# Patient Record
Sex: MALE | Race: BLACK OR AFRICAN AMERICAN | NOT HISPANIC OR LATINO | Employment: FULL TIME | URBAN - METROPOLITAN AREA
[De-identification: names, ages, dates, MRNs, and addresses within clinical notes are randomized per-mention and may not be internally consistent; named-entity substitution may affect disease eponyms.]

---

## 2024-03-30 ENCOUNTER — HOSPITAL ENCOUNTER (OUTPATIENT)
Facility: HOSPITAL | Age: 34
Setting detail: OBSERVATION
Discharge: HOME | End: 2024-03-31
Attending: EMERGENCY MEDICINE | Admitting: INTERNAL MEDICINE
Payer: COMMERCIAL

## 2024-03-30 ENCOUNTER — APPOINTMENT (EMERGENCY)
Dept: RADIOLOGY | Facility: HOSPITAL | Age: 34
End: 2024-03-30
Payer: COMMERCIAL

## 2024-03-30 DIAGNOSIS — M54.50 ACUTE BILATERAL LOW BACK PAIN WITHOUT SCIATICA: ICD-10-CM

## 2024-03-30 DIAGNOSIS — M54.9 BACK PAIN: Primary | ICD-10-CM

## 2024-03-30 PROBLEM — R26.2 AMBULATORY DYSFUNCTION: Status: ACTIVE | Noted: 2024-03-30

## 2024-03-30 LAB
ANION GAP SERPL CALCULATED.3IONS-SCNC: 11 MMOL/L (ref 4–13)
BUN SERPL-MCNC: 14 MG/DL (ref 5–25)
CALCIUM SERPL-MCNC: 9.2 MG/DL (ref 8.4–10.2)
CHLORIDE SERPL-SCNC: 102 MMOL/L (ref 96–108)
CO2 SERPL-SCNC: 23 MMOL/L (ref 21–32)
CREAT SERPL-MCNC: 0.93 MG/DL (ref 0.6–1.3)
ERYTHROCYTE [DISTWIDTH] IN BLOOD BY AUTOMATED COUNT: 12.1 % (ref 11.6–15.1)
GFR SERPL CREATININE-BSD FRML MDRD: 107 ML/MIN/1.73SQ M
GLUCOSE SERPL-MCNC: 93 MG/DL (ref 65–140)
HCT VFR BLD AUTO: 44.1 % (ref 36.5–49.3)
HGB BLD-MCNC: 14.4 G/DL (ref 12–17)
MCH RBC QN AUTO: 28.3 PG (ref 26.8–34.3)
MCHC RBC AUTO-ENTMCNC: 32.7 G/DL (ref 31.4–37.4)
MCV RBC AUTO: 87 FL (ref 82–98)
PLATELET # BLD AUTO: 275 THOUSANDS/UL (ref 149–390)
PMV BLD AUTO: 9.6 FL (ref 8.9–12.7)
POTASSIUM SERPL-SCNC: 3.7 MMOL/L (ref 3.5–5.3)
RBC # BLD AUTO: 5.09 MILLION/UL (ref 3.88–5.62)
SODIUM SERPL-SCNC: 136 MMOL/L (ref 135–147)
WBC # BLD AUTO: 5.17 THOUSAND/UL (ref 4.31–10.16)

## 2024-03-30 PROCEDURE — 85027 COMPLETE CBC AUTOMATED: CPT | Performed by: INTERNAL MEDICINE

## 2024-03-30 PROCEDURE — 96372 THER/PROPH/DIAG INJ SC/IM: CPT

## 2024-03-30 PROCEDURE — 36415 COLL VENOUS BLD VENIPUNCTURE: CPT | Performed by: INTERNAL MEDICINE

## 2024-03-30 PROCEDURE — 99283 EMERGENCY DEPT VISIT LOW MDM: CPT

## 2024-03-30 PROCEDURE — 80048 BASIC METABOLIC PNL TOTAL CA: CPT | Performed by: INTERNAL MEDICINE

## 2024-03-30 PROCEDURE — 72110 X-RAY EXAM L-2 SPINE 4/>VWS: CPT

## 2024-03-30 PROCEDURE — 99285 EMERGENCY DEPT VISIT HI MDM: CPT | Performed by: EMERGENCY MEDICINE

## 2024-03-30 PROCEDURE — 99222 1ST HOSP IP/OBS MODERATE 55: CPT | Performed by: INTERNAL MEDICINE

## 2024-03-30 RX ORDER — KETOROLAC TROMETHAMINE 30 MG/ML
15 INJECTION, SOLUTION INTRAMUSCULAR; INTRAVENOUS ONCE
Status: COMPLETED | OUTPATIENT
Start: 2024-03-30 | End: 2024-03-30

## 2024-03-30 RX ORDER — METHOCARBAMOL 750 MG/1
750 TABLET, FILM COATED ORAL EVERY 6 HOURS SCHEDULED
Status: DISCONTINUED | OUTPATIENT
Start: 2024-03-31 | End: 2024-03-31 | Stop reason: HOSPADM

## 2024-03-30 RX ORDER — ACETAMINOPHEN 325 MG/1
975 TABLET ORAL EVERY 8 HOURS SCHEDULED
Status: DISCONTINUED | OUTPATIENT
Start: 2024-03-30 | End: 2024-03-31 | Stop reason: HOSPADM

## 2024-03-30 RX ORDER — OXYCODONE HYDROCHLORIDE 5 MG/1
5 TABLET ORAL ONCE
Status: COMPLETED | OUTPATIENT
Start: 2024-03-30 | End: 2024-03-30

## 2024-03-30 RX ORDER — ONDANSETRON 2 MG/ML
4 INJECTION INTRAMUSCULAR; INTRAVENOUS EVERY 4 HOURS PRN
Status: DISCONTINUED | OUTPATIENT
Start: 2024-03-30 | End: 2024-03-31 | Stop reason: HOSPADM

## 2024-03-30 RX ORDER — PANTOPRAZOLE SODIUM 40 MG/1
40 TABLET, DELAYED RELEASE ORAL
Status: DISCONTINUED | OUTPATIENT
Start: 2024-03-31 | End: 2024-03-31 | Stop reason: HOSPADM

## 2024-03-30 RX ORDER — KETOROLAC TROMETHAMINE 30 MG/ML
15 INJECTION, SOLUTION INTRAMUSCULAR; INTRAVENOUS EVERY 6 HOURS SCHEDULED
Status: DISCONTINUED | OUTPATIENT
Start: 2024-03-31 | End: 2024-03-31 | Stop reason: HOSPADM

## 2024-03-30 RX ORDER — METHYLPREDNISOLONE 4 MG/1
8 TABLET ORAL DAILY
Status: DISCONTINUED | OUTPATIENT
Start: 2024-04-04 | End: 2024-03-31 | Stop reason: HOSPADM

## 2024-03-30 RX ORDER — METHOCARBAMOL 500 MG/1
500 TABLET, FILM COATED ORAL ONCE
Status: COMPLETED | OUTPATIENT
Start: 2024-03-30 | End: 2024-03-30

## 2024-03-30 RX ORDER — LIDOCAINE 50 MG/G
1 PATCH TOPICAL DAILY
Status: DISCONTINUED | OUTPATIENT
Start: 2024-03-30 | End: 2024-03-31 | Stop reason: HOSPADM

## 2024-03-30 RX ORDER — METHYLPREDNISOLONE 4 MG/1
4 TABLET ORAL DAILY
Status: DISCONTINUED | OUTPATIENT
Start: 2024-04-05 | End: 2024-03-31 | Stop reason: HOSPADM

## 2024-03-30 RX ORDER — METHYLPREDNISOLONE 16 MG/1
16 TABLET ORAL DAILY
Status: DISCONTINUED | OUTPATIENT
Start: 2024-04-02 | End: 2024-03-31 | Stop reason: HOSPADM

## 2024-03-30 RX ORDER — METHYLPREDNISOLONE 4 MG/1
12 TABLET ORAL DAILY
Status: DISCONTINUED | OUTPATIENT
Start: 2024-04-03 | End: 2024-03-31 | Stop reason: HOSPADM

## 2024-03-30 RX ORDER — OXYCODONE HYDROCHLORIDE 5 MG/1
5 TABLET ORAL ONCE
Status: DISCONTINUED | OUTPATIENT
Start: 2024-03-30 | End: 2024-03-30

## 2024-03-30 RX ADMIN — PREDNISONE 50 MG: 20 TABLET ORAL at 21:14

## 2024-03-30 RX ADMIN — METHOCARBAMOL 500 MG: 500 TABLET ORAL at 20:48

## 2024-03-30 RX ADMIN — OXYCODONE HYDROCHLORIDE 5 MG: 5 TABLET ORAL at 21:40

## 2024-03-30 RX ADMIN — LIDOCAINE 5% 1 PATCH: 700 PATCH TOPICAL at 23:41

## 2024-03-30 RX ADMIN — KETOROLAC TROMETHAMINE 15 MG: 30 INJECTION, SOLUTION INTRAMUSCULAR; INTRAVENOUS at 20:50

## 2024-03-30 RX ADMIN — ACETAMINOPHEN 975 MG: 325 TABLET, FILM COATED ORAL at 23:41

## 2024-03-30 NOTE — Clinical Note
Case was discussed with Dr. Parsons and the patient's admission status was agreed to be Admission Status: observation status to the service of Dr. Parsons

## 2024-03-31 VITALS
BODY MASS INDEX: 23.62 KG/M2 | OXYGEN SATURATION: 99 % | SYSTOLIC BLOOD PRESSURE: 128 MMHG | DIASTOLIC BLOOD PRESSURE: 68 MMHG | HEART RATE: 63 BPM | WEIGHT: 190 LBS | RESPIRATION RATE: 16 BRPM | HEIGHT: 75 IN | TEMPERATURE: 98.2 F

## 2024-03-31 PROCEDURE — 99239 HOSP IP/OBS DSCHRG MGMT >30: CPT | Performed by: NURSE PRACTITIONER

## 2024-03-31 RX ORDER — METHYLPREDNISOLONE 4 MG/1
TABLET ORAL DAILY
Qty: 15 TABLET | Refills: 0 | Status: SHIPPED | OUTPATIENT
Start: 2024-04-01 | End: 2024-04-05

## 2024-03-31 RX ORDER — PANTOPRAZOLE SODIUM 40 MG/1
40 TABLET, DELAYED RELEASE ORAL
Qty: 30 TABLET | Refills: 0 | Status: SHIPPED | OUTPATIENT
Start: 2024-04-01

## 2024-03-31 RX ORDER — LIDOCAINE 50 MG/G
1 PATCH TOPICAL DAILY
Qty: 5 PATCH | Refills: 0 | Status: SHIPPED | OUTPATIENT
Start: 2024-04-01 | End: 2024-04-06

## 2024-03-31 RX ORDER — METHOCARBAMOL 750 MG/1
750 TABLET, FILM COATED ORAL EVERY 6 HOURS SCHEDULED
Qty: 30 TABLET | Refills: 0 | Status: SHIPPED | OUTPATIENT
Start: 2024-03-31

## 2024-03-31 RX ORDER — ACETAMINOPHEN 500 MG
1000 TABLET ORAL EVERY 8 HOURS
Start: 2024-03-31

## 2024-03-31 RX ADMIN — LIDOCAINE 5% 1 PATCH: 700 PATCH TOPICAL at 08:05

## 2024-03-31 RX ADMIN — METHYLPREDNISOLONE 24 MG: 16 TABLET ORAL at 08:08

## 2024-03-31 RX ADMIN — PANTOPRAZOLE SODIUM 40 MG: 40 TABLET, DELAYED RELEASE ORAL at 06:15

## 2024-03-31 RX ADMIN — KETOROLAC TROMETHAMINE 15 MG: 30 INJECTION, SOLUTION INTRAMUSCULAR; INTRAVENOUS at 03:10

## 2024-03-31 RX ADMIN — METHOCARBAMOL TABLETS 750 MG: 750 TABLET, COATED ORAL at 08:05

## 2024-03-31 RX ADMIN — KETOROLAC TROMETHAMINE 15 MG: 30 INJECTION, SOLUTION INTRAMUSCULAR; INTRAVENOUS at 08:05

## 2024-03-31 RX ADMIN — METHOCARBAMOL TABLETS 750 MG: 750 TABLET, COATED ORAL at 03:10

## 2024-03-31 RX ADMIN — ACETAMINOPHEN 975 MG: 325 TABLET, FILM COATED ORAL at 06:15

## 2024-03-31 NOTE — UTILIZATION REVIEW
Initial Clinical Review    Admission: Date/Time/Statement:   Admission Orders (From admission, onward)       Ordered        03/30/24 1586  Place in Observation  Once                          Orders Placed This Encounter   Procedures    Place in Observation     Standing Status:   Standing     Number of Occurrences:   1     Order Specific Question:   Level of Care     Answer:   Med Surg [16]     ED Arrival Information       Expected   -    Arrival   3/30/2024 20:16    Acuity   Less Urgent              Means of arrival   Walk-In    Escorted by   Family Member    Service   Hospitalist    Admission type   Emergency              Arrival complaint   -             Chief Complaint   Patient presents with    Back Pain     Prior history of back pain (follows spine and pain specialists). Today rode on train in to visit his mom and carried a large duffle bag. Tonight while visiting mom he started to feel the mid low back pain and was unable to move. He then did fall to his knees and was helped back up. Pain radiates down left leg.        Initial Presentation: 33 y.o. male  presents with intractable back pain and inability to ambulate.  Patient states he was lifting a duffel bag while on a train to this area when he developed sudden onset central back pain with radiation into left leg described as dull, constant, and without alleviating/exacerbating factors  patient received IV Toradol, Robaxin, prednisone, and oxycodone with some improvement in pain.   However  pain worsened with any attempted ambulation and  he could not ambulate.  Exam notes bilateral paraspinal hypertonicity somewhat greater on right admitted  as observation for intractable low back pain and ambulatory dysfunction.       ED Triage Vitals   Temperature Pulse Respirations Blood Pressure SpO2   03/30/24 2018 03/30/24 2018 03/30/24 2018 03/30/24 2018 03/30/24 2018   98.2 °F (36.8 °C) 84 16 167/81 100 %      Temp Source Heart Rate Source Patient Position -  "Orthostatic VS BP Location FiO2 (%)   03/30/24 2018 -- 03/30/24 2200 03/30/24 2018 --   Oral  Sitting Right arm       Pain Score       03/30/24 2018       9          Wt Readings from Last 1 Encounters:   03/30/24 86.2 kg (190 lb)     Additional Vital Signs:    Date/Time Temp Pulse Resp BP MAP (mmHg) SpO2 O2 Device Patient Position - Orthostatic VS   03/31/24 0309 -- 63 16 128/68 -- 99 % None (Room air) Lying   03/30/24 2200 -- 68 16 149/71 102 99 % -- Sitting     Pertinent Labs/Diagnostic Test Results:   XR spine lumbar minimum 4 views non injury   Final Result by Austin Singh MD (03/31 0751)      No acute osseous abnormality.         Workstation performed: [id number]               Results from last 7 days   Lab Units 03/30/24  2317   WBC Thousand/uL 5.17   HEMOGLOBIN g/dL 14.4   HEMATOCRIT % 44.1   PLATELETS Thousands/uL 275         Results from last 7 days   Lab Units 03/30/24  2317   SODIUM mmol/L 136   POTASSIUM mmol/L 3.7   CHLORIDE mmol/L 102   CO2 mmol/L 23   ANION GAP mmol/L 11   BUN mg/dL 14   CREATININE mg/dL 0.93   EGFR ml/min/1.73sq m 107   CALCIUM mg/dL 9.2             Results from last 7 days   Lab Units 03/30/24  2317   GLUCOSE RANDOM mg/dL 93             No results found for: \"BETA-HYDROXYBUTYRATE\"                                                                                                                                         ED Treatment:   Medication Administration from 03/30/2024 2016 to 03/31/2024 0838         Date/Time Order Dose Route Action Action by Comments     03/30/2024 2050 EDT ketorolac (TORADOL) injection 15 mg 15 mg Intramuscular Given Batool Rodrigez RN --     03/30/2024 2048 EDT methocarbamol (ROBAXIN) tablet 500 mg 500 mg Oral Given Batool Rodrigez RN --     03/30/2024 2114 EDT predniSONE tablet 50 mg 50 mg Oral Given Batool Rodrigez RN --     03/30/2024 2140 EDT oxyCODONE (ROXICODONE) IR tablet 5 mg 5 mg Oral Given Batool Rodrigez RN --     03/30/2024 2236 EDT " oxyCODONE (ROXICODONE) IR tablet 5 mg 5 mg Oral Not Given Batool Rodrigez RN discontinued     03/31/2024 0615 EDT acetaminophen (TYLENOL) tablet 975 mg 975 mg Oral Given Batool Rodrigez RN --     03/30/2024 2341 EDT acetaminophen (TYLENOL) tablet 975 mg 975 mg Oral Given Batool Rodrigez RN --     03/31/2024 0805 EDT ketorolac (TORADOL) injection 15 mg 15 mg Intravenous Given [facility] --     03/31/2024 0310 EDT ketorolac (TORADOL) injection 15 mg 15 mg Intravenous Given Batool Rodrigez RN --     03/31/2024 0615 EDT pantoprazole (PROTONIX) EC tablet 40 mg 40 mg Oral Given Batool Rodrigez RN --     03/31/2024 0808 EDT methylprednisolone (MEDROL) tablet 24 mg 24 mg Oral Given [facility] --     03/31/2024 0805 EDT lidocaine (LIDODERM) 5 % patch 1 patch 1 patch Topical Medication Applied [facility] --     03/31/2024 0804 EDT lidocaine (LIDODERM) 5 % patch 1 patch 1 patch Topical Patch Removed [facility] --     03/30/2024 2341 EDT lidocaine (LIDODERM) 5 % patch 1 patch 1 patch Topical Medication Applied Batool Rodrigez RN --     03/31/2024 0805 EDT methocarbamol (ROBAXIN) tablet 750 mg 750 mg Oral Given [facility] --     03/31/2024 0310 EDT methocarbamol (ROBAXIN) tablet 750 mg 750 mg Oral Given Batool Rodrigez RN --          Past Medical History:   Diagnosis Date    Sciatica      Present on Admission:   Acute bilateral low back pain without sciatica   Ambulatory dysfunction      Admitting Diagnosis: Back pain [M54.9]  Age/Sex: 33 y.o. male  Admission Orders:  Scheduled Medications:  acetaminophen, 975 mg, Oral, Q8H KAREN  ketorolac, 15 mg, Intravenous, Q6H KAREN  lidocaine, 1 patch, Topical, Daily  methocarbamol, 750 mg, Oral, Q6H KAREN  [START ON 4/1/2024] methylPREDNISolone, 20 mg, Oral, Daily   Followed by  [START ON 4/2/2024] methylPREDNISolone, 16 mg, Oral, Daily   Followed by  [START ON 4/3/2024] methylPREDNISolone, 12 mg, Oral, Daily   Followed by  [START ON 4/4/2024] methylPREDNISolone, 8 mg, Oral,  Daily   Followed by  [START ON 4/5/2024] methylPREDNISolone, 4 mg, Oral, Daily  pantoprazole, 40 mg, Oral, Early Morning      Continuous IV Infusions:     PRN Meds:  ondansetron, 4 mg, Intravenous, Q4H PRN    Aqua K   Reg diet    Activity as clare     VS routine      None    Network Utilization Review Department  ATTENTION: Please call with any questions or concerns to [phone] and carefully listen to the prompts so that you are directed to the right person. All voicemails are confidential.   For Discharge needs, contact Care Management DC Support Team at [phone] opt. [phone]  Send all requests for admission clinical reviews, approved or denied determinations and any other requests to dedicated fax number below belonging to the campus where the patient is receiving treatment. List of dedicated fax numbers for the Facilities:  FACILITY NAME UR FAX NUMBER   ADMISSION DENIALS (Administrative/Medical Necessity) [phone]   DISCHARGE SUPPORT TEAM (NETWORK) [phone]   PARENT CHILD HEALTH (Maternity/NICU/Pediatrics) [phone]   [facility] [phone]   [facility] [phone]   [facility] [phone]   [facility] [phone]   [facility] [phone]   [facility] [phone]   General acute hospital [phone]   [facility] [phone]   [facility] [phone]   [facility] [phone]   [facility] [phone]   [facility] [phone]

## 2024-03-31 NOTE — DISCHARGE SUMMARY
[facility]  Discharge- Bonnie Rudd 1990, 33 y.o. male MRN: [id number]  Unit/Bed#: [facility] Encounter: [id number]  Primary Care Provider: No primary care provider on file.   Date and time admitted to hospital: 3/30/2024  8:16 PM    * Acute bilateral low back pain without sciatica  Assessment & Plan  Presenting with intractable low back pain and inability to ambulate secondary to this after lifting heavy object/ luggage bag from [place]   Pain somewhat improved following Toradol, prednisone, Robaxin, and oxycodone during ED evaluation . Had ongoing pain but improved with multiple doses of medications   Obtained x-ray lumbar spine,  no acute osseous abnormality identified  Trial pain control regimen with scheduled Tylenol, Lidoderm patch, Toradol IV, Robaxin, Medrol Dosepak with Protonix for acid suppression  Pt reports he is doing better after the night of medications ,able to walk no urinary or bowel concerns.   Pt with good lower extremity strength   Instruction provided to the pt in regard to medication regimen at discharge  Instructed to call and schedule fu appointment with neurosurgery if ongoing concern and if staying in the area. Otherwise will seek fu in his home area of [place]    Ambulatory dysfunction  Assessment & Plan  In the setting of intractable back pain, management of this as above.    Pt able to ambulate this am       Medical Problems       Resolved Problems  Date Reviewed: 3/31/2024   None       Discharging Physician / Practitioner: JOSÉ MIGUEL Tai  PCP: No primary care provider on file.  Admission Date:   Admission Orders (From admission, onward)       Ordered        03/30/24 2126  Place in Observation  Once                          Discharge Date: 03/31/24    Consultations During Hospital Stay:  none    Procedures Performed:   Lumbar xray of spine: No acute osseous abnormality.     Significant Findings / Test Results:   See above      Incidental Findings:   none       Test Results Pending at Discharge (will require follow up):   none     Outpatient Tests Requested:  Call to schedule follow up with a family doctor     Complications:  none    Reason for Admission: lumbar back pain     Hospital Course:   Bonnie Rudd is a 33 y.o. male patient who originally presented to the hospital on 3/30/2024 due to lumbar back pain.  Patient reported he was lifting a duffel bag while on a train and commute to this area where he developed sudden onset of central back pain with radiation to the left leg described as dull, constant, without any alleviating exacerbating factors.  He denied any fevers or chills no focal weaknesses no perianal numbness urinary fecal incontinence or apparent urinary retention.  And denied any IV drug use or cancer history.  Patient reported that he was told by a physician previously that he had a herniated disc which she was managing conservatively.  While patient was evaluated in the ED he received IV Toradol, Robaxin prednisone and oxycodone and had some improvement with pain however this persistent and worsened with any attempt at ambulation.  Patient was admitted for further observation.  While in the ED patient continued to be treated with Toradol Robaxin and NSAIDs along with around-the-clock Tylenol.  Patient reported significant improvement as of this morning.  He stated he was able to ambulate.  We discussed taking it easy for a few weeks.  Patient had large duffel bag in the room requested that his family obtain this back and carried rather than he at discharge.  Patient's mother was admitted to the ICU for brain surgery upcoming.  Patient was eager to be discharged to be at her side.  Medications were ordered and sent to [facility] to assist with ongoing discomfort.  Patient will trial for the next 5 to 7 days.  He was instructed to call and schedule outpatient appointment with neurosurgery if staying in the area versus  "cool the prior orthopedic surgeon he had seen in [place].  On examination this morning patient had equal strength in bilateral lower extremities.  No numbness or paresthesias no cauda equina type symptoms.  Patient medically stable for discharge.        Please see above list of diagnoses and related plan for additional information.     Condition at Discharge: good    Discharge Day Visit / Exam:   Subjective:  Pt reports he is doing well and now able to walk and function. Conservatively. He understands plan he denies any numbness tingling has equal strength and sensation in both legs   Vitals: Blood Pressure: 128/68 (03/31/24 0309)  Pulse: 63 (03/31/24 0309)  Temperature: 98.2 °F (36.8 °C) (03/30/24 2018)  Temp Source: Oral (03/30/24 2018)  Respirations: 16 (03/31/24 0309)  Height: 6' 3\" (190.5 cm) (03/30/24 2018)  Weight - Scale: 86.2 kg (190 lb) (03/30/24 2018)  SpO2: 99 % (03/31/24 0309)  Exam:   Physical Exam  Constitutional:       General: He is not in acute distress.     Appearance: He is not ill-appearing, toxic-appearing or diaphoretic.   HENT:      Head: Normocephalic and atraumatic.      Mouth/Throat:      Pharynx: Oropharynx is clear.   Eyes:      General:         Right eye: No discharge.         Left eye: No discharge.   Cardiovascular:      Rate and Rhythm: Normal rate.      Heart sounds: No murmur heard.     No friction rub. No gallop.   Pulmonary:      Effort: No respiratory distress.      Breath sounds: No stridor. No wheezing, rhonchi or rales.   Chest:      Chest wall: No tenderness.   Abdominal:      General: There is no distension.      Palpations: There is no mass.      Tenderness: There is no abdominal tenderness. There is no guarding or rebound.      Hernia: No hernia is present.   Musculoskeletal:         General: Tenderness (lumbar spine midlevel) present. No swelling, deformity or signs of injury.      Right lower leg: No edema.      Left lower leg: No edema.   Skin:     Coloration: Skin is " not jaundiced or pale.      Findings: No bruising, erythema, lesion or rash.   Neurological:      Mental Status: He is alert and oriented to person, place, and time.   Psychiatric:         Behavior: Behavior normal.          Discussion with Family: Patient declined call to .     Discharge instructions/Information to patient and family:   See after visit summary for information provided to patient and family.      Provisions for Follow-Up Care:  See after visit summary for information related to follow-up care and any pertinent home health orders.      Mobility at time of Discharge:   Basic Mobility Inpatient Raw Score: 18  -[facility] Goal: 6: Walk 10 steps or more  -HLM Achieved: 6: Walk 10 steps or more  Pt reports he has been walking around the room      Disposition:   Home    Planned Readmission: no plan for readmission      Discharge Statement:  I spent 38 minutes discharging the patient. This time was spent on the day of discharge. I had direct contact with the patient on the day of discharge. Greater than 50% of the total time was spent examining patient, answering all patient questions, arranging and discussing plan of care with patient as well as directly providing post-discharge instructions.  Additional time then spent on discharge activities.    Discharge Medications:  See after visit summary for reconciled discharge medications provided to patient and/or family.      **Please Note: This note may have been constructed using a voice recognition system**

## 2024-03-31 NOTE — ASSESSMENT & PLAN NOTE
Presenting with intractable low back pain and inability to ambulate secondary to this after lifting heavy object  Pain somewhat improved following Toradol, prednisone, Robaxin, and oxycodone during ED evaluation however persisting with inability to ambulate secondary to degree of pain  Obtained x-ray lumbar spine, await final read however no acute osseous abnormality identified  Trial pain control regimen with scheduled Tylenol, Lidoderm patch, Toradol IV, Robaxin, Medrol Dosepak with Protonix for acid suppression  Ambulatory trial in a.m., may need PT/OT evaluation if he remains unable to ambulate

## 2024-03-31 NOTE — ED PROVIDER NOTES
History  Chief Complaint   Patient presents with    Back Pain     Prior history of back pain (follows spine and pain specialists). Today rode on train in to visit his mom and carried a large duffle bag. Tonight while visiting mom he started to feel the mid low back pain and was unable to move. He then did fall to his knees and was helped back up. Pain radiates down left leg.      HPI  Patient is 33-year-old male with history of low back pain presenting to ED for low back pain. Patient reports was getting off train while carrying large duffel bag and low back pain began. Patient reports had to drop bag. Patient reports pain progressively worsening and while at [facility] house dropped to knees due to pain. Patient reports unable to walk due to pain.Patient put lidocaine patch on with no relief.  Patient was seen by physician in [place] where he lives and told had slipped disc based off xrays. Patient denies fever chills, IVDA, leg weakness, bladder/bowel incontinence, urine retention, recent trauma.   None       No past medical history on file.    No past surgical history on file.    No family history on file.  I have reviewed and agree with the history as documented.    No existing history information found.  No existing history information found.        Review of Systems   Constitutional:  Negative for chills and fever.   HENT:  Negative for ear pain and sore throat.    Respiratory:  Negative for cough and shortness of breath.    Cardiovascular:  Negative for chest pain, palpitations and leg swelling.   Gastrointestinal:  Negative for abdominal pain, diarrhea, nausea and vomiting.   Genitourinary:  Negative for dysuria, frequency and hematuria.   Musculoskeletal:  Positive for back pain. Negative for neck pain.   Skin:  Negative for rash.   Neurological:  Negative for dizziness, light-headedness and headaches.       Physical Exam  ED Triage Vitals [03/30/24 2018]   Temperature Pulse Respirations Blood Pressure SpO2    98.2 °F (36.8 °C) 84 16 167/81 100 %      Temp Source Heart Rate Source Patient Position - Orthostatic VS BP Location FiO2 (%)   Oral -- -- Right arm --      Pain Score       9             Orthostatic Vital Signs  Vitals:    03/30/24 2018   BP: 167/81   Pulse: 84       Physical Exam  Vitals reviewed.   Constitutional:       General: He is awake.   HENT:      Head: Normocephalic and atraumatic.      Mouth/Throat:      Mouth: Mucous membranes are moist.   Eyes:      Extraocular Movements: Extraocular movements intact.      Right eye: No nystagmus.      Left eye: No nystagmus.      Conjunctiva/sclera: Conjunctivae normal.      Pupils: Pupils are equal, round, and reactive to light.   Cardiovascular:      Rate and Rhythm: Normal rate and regular rhythm.      Pulses: Normal pulses.      Heart sounds: Normal heart sounds, S1 normal and S2 normal. Heart sounds not distant. No murmur heard.     No friction rub. No gallop.   Pulmonary:      Breath sounds: No stridor. No wheezing, rhonchi or rales.      Comments: CTA b/l   Abdominal:      General: Bowel sounds are normal.      Palpations: Abdomen is soft.      Tenderness: There is no abdominal tenderness.   Musculoskeletal:      Right lower leg: No edema.      Left lower leg: No edema.      Comments: Pain with flexion of back.    Skin:     General: Skin is warm and dry.      Capillary Refill: Capillary refill takes less than 2 seconds.   Neurological:      Mental Status: He is alert and oriented to person, place, and time.      GCS: GCS eye subscore is 4. GCS verbal subscore is 5. GCS motor subscore is 6.      Cranial Nerves: Cranial nerves 2-12 are intact.      Sensory: Sensation is intact.      Comments: + straight leg raise b/l; pain with attempted flexion of hip, 5/5 plantar flexion and dorsiflexion of b/l feet.         ED Medications  Medications - No data to display    Diagnostic Studies  Results Reviewed       None                   No orders to display          Procedures  Procedures      ED Course  ED Course as of 03/30/24 2310   Sat Mar 30, 2024   2137 On re-evaluation no improvement with Toradol or robaxin, will give oxycodone.    2224 On re-eval no relief from oxycodone, still unable to walk.    2301 Still no relief, unable to walk, will reach out to [facility] for admission.                              SBIRT 20yo+      Flowsheet Row Most Recent Value   Initial Alcohol Screen: US AUDIT-C     1. How often do you have a drink containing alcohol? 0 Filed at: 03/30/2024 2019   2. How many drinks containing alcohol do you have on a typical day you are drinking?  0 Filed at: 03/30/2024 2019   3a. Male UNDER 65: How often do you have five or more drinks on one occasion? 0 Filed at: 03/30/2024 2019   Audit-C Score 0 Filed at: 03/30/2024 2019   BEN: How many times in the past year have you...    Used an illegal drug or used a prescription medication for non-medical reasons? Never Filed at: 03/30/2024 2019                  Medical Decision Making  Risk  Prescription drug management.        Patient is 33 y.o. male with PMH of low back pain presenting to ED for low back pain.. See history and physical documented above.       Differential diagnosis included but not limited to MSK back pain, bulging disc Plan symptomatic treatment with toradol, robaxin, prednisone and reassess.    View ED course above for further discussion on patient workup.       All labs reviewed and utilized in the medical decision making process  All radiology studies independently viewed by me and interpreted by the radiologist.  I reviewed all testing with the patient.     Upon re-evaluation patient remains unable to walk secondary to pain. Will admit for observation to [facility] for pain control.        Disposition  Final diagnoses:   Back pain     Time reflects when diagnosis was documented in both MDM as applicable and the Disposition within this note       Time User Action Codes Description Comment     3/30/2024 11:15 PM Latha De Souza [M54.9] Back pain     3/31/2024  9:35 AM Diana Montano [M54.50] Acute bilateral low back pain without sciatica           ED Disposition       ED Disposition   Discharge    Condition   --    Date/Time   Sun Mar 31, 2024  9:59 AM    Comment   Case was discussed with Dr. Parsons and the patient's admission status was agreed to be Admission Status: observation status to the service of Dr. Parsons  Evaluated by SLIM this morning, patients status was agreed to be discharged per Diana VALDEZ             Follow-up Information       Follow up With Specialties Details Why Contact Info    Fer Velazquez MD Neurosurgery Follow up Please call to schedule follow up if ongoing pain and unable to return back to [place] orthopedics in [address] [address] [phone]      Family Doctor  Follow up Should follow up with a family doctor to be able to manage and coordinate all your health care             Patient's Medications    No medications on file     No discharge procedures on file.    PDMP Review       None             ED Provider  Attending physically available and evaluated Bonnie Rudd. I managed the patient along with the ED Attending.    Electronically Signed by           Latha De Souza DO  04/03/24 7600

## 2024-03-31 NOTE — ED NOTES
Patient assisted to undress and placed in hospital hold bed     Batool Rodrigez RN  03/30/24 2798

## 2024-03-31 NOTE — H&P
[facility]  H&P  Name: Bonnie Rudd 33 y.o. male I MRN: [id number]  Unit/Bed#: ED 06 I Date of Admission: 3/30/2024   Date of Service: 3/31/2024 I Hospital Day: 0      Assessment/Plan   * Acute bilateral low back pain without sciatica  Assessment & Plan  Presenting with intractable low back pain and inability to ambulate secondary to this after lifting heavy object  Pain somewhat improved following Toradol, prednisone, Robaxin, and oxycodone during ED evaluation however persisting with inability to ambulate secondary to degree of pain  Obtained x-ray lumbar spine, await final read however no acute osseous abnormality identified  Trial pain control regimen with scheduled Tylenol, Lidoderm patch, Toradol IV, Robaxin, Medrol Dosepak with Protonix for acid suppression  Ambulatory trial in a.m., may need PT/OT evaluation if he remains unable to ambulate    Ambulatory dysfunction  Assessment & Plan  In the setting of intractable back pain, management of this as above.  Attempt to ambulate patient in a.m.             VTE Prophylaxis: Pharmacologic VTE Prophylaxis contraindicated due to low risk   / reason for no mechanical VTE prophylaxis low risk, ambulation encouraged    Code Status: Level 1 - Full Code   POLST: POLST form is not discussed and not completed at this time.  Discussion with family: Patient declines at this time    Anticipated Length of Stay:  Patient will be admitted on an Observation basis with an anticipated length of stay of less than 2 midnights.   Justification for Hospital Stay: Please see detailed plans noted above.    Chief Complaint:     Intractable back pain  History of Present Illness:  Bonnie Rudd is a 33 y.o. male who presents with intractable back pain and inability to ambulate.  Patient states he was lifting a duffel bag while on a train to this area when he developed sudden onset central back pain with radiation into left leg described as  dull, constant, and without alleviating/exacerbating factors otherwise.  He denied fever/chills, focal weakness, perineal numbness, urinary/fecal incontinence or apparent urinary retention; denies any history of IVDU or cancer additionally.  States he has been told by his PCP previously that he apparently has a herniated disc which is currently conservatively managed.    During ED evaluation patient received IV Toradol, Robaxin, prednisone, and oxycodone with some improvement in pain however persisted and worsened with any attempted ambulation to which he could not ambulate thus patient admitted in the setting given intractable low back pain and ambulatory dysfunction secondary to this.    Review of Systems:    Constitutional:  Denies fever or chills   Eyes:  Denies change in visual acuity   HENT:  Denies nasal congestion or sore throat   Respiratory:  Denies cough or shortness of breath   Cardiovascular:  Denies chest pain or edema   GI:  Denies abdominal pain, nausea, vomiting, bloody stools or diarrhea   :  Denies dysuria   Musculoskeletal:  Denies joint pain but reports back pain  Integument:  Denies rash   Neurologic:  Denies headache, focal weakness or sensory changes   Endocrine:  Denies polyuria or polydipsia   Lymphatic:  Denies swollen glands   Psychiatric:  Denies depression or anxiety     Past Medical and Surgical History:   Past Medical History:   Diagnosis Date    Sciatica      History reviewed. No pertinent surgical history.    Meds/Allergies:    Current Facility-Administered Medications:     acetaminophen (TYLENOL) tablet 975 mg, 975 mg, Oral, [facility] Chidi Parsons DO, 975 mg at 03/30/24 2341    ketorolac (TORADOL) injection 15 mg, 15 mg, Intravenous, [facility], Chidi Parsons DO    lidocaine (LIDODERM) 5 % patch 1 patch, 1 patch, Topical, Daily, Chidi Parsons DO, 1 patch at 03/30/24 2341    methocarbamol (ROBAXIN) tablet 750 mg, 750 mg, Oral, [facility], Chidi Parsons DO    methylprednisolone  "(MEDROL) tablet 24 mg, 24 mg, Oral, Daily **FOLLOWED BY** [START ON 4/1/2024] methylprednisolone (MEDROL) tablet 20 mg, 20 mg, Oral, Daily **FOLLOWED BY** [START ON 4/2/2024] methylPREDNISolone (MEDROL) tablet 16 mg, 16 mg, Oral, Daily **FOLLOWED BY** [START ON 4/3/2024] methylprednisolone (MEDROL) tablet 12 mg, 12 mg, Oral, Daily **FOLLOWED BY** [START ON 4/4/2024] methylprednisolone (MEDROL) tablet 8 mg, 8 mg, Oral, Daily **FOLLOWED BY** [START ON 4/5/2024] methylprednisolone (MEDROL) tablet 4 mg, 4 mg, Oral, Daily, Chidi Parsons DO    ondansetron (ZOFRAN) injection 4 mg, 4 mg, Intravenous, Q4H PRN, Chidi Parsons DO    pantoprazole (PROTONIX) EC tablet 40 mg, 40 mg, Oral, Early Morning, Chidi Parsons DO  No current outpatient medications on file.        Allergies: No Known Allergies  History:  Marital Status: Single     Substance Use History:   Social History     Substance and Sexual Activity   Alcohol Use Yes    Alcohol/week: 2.0 standard drinks of alcohol    Types: 2 Standard drinks or equivalent per week     Social History     Tobacco Use   Smoking Status Never   Smokeless Tobacco Never     Social History     Substance and Sexual Activity   Drug Use Never       Family History:  Noncontributory    Physical Exam:     Vitals:   Blood Pressure: 149/71 (03/30/24 2200)  Pulse: 68 (03/30/24 2200)  Temperature: 98.2 °F (36.8 °C) (03/30/24 2018)  Temp Source: Oral (03/30/24 2018)  Respirations: 16 (03/30/24 2200)  Height: 6' 3\" (190.5 cm) (03/30/24 2018)  Weight - Scale: 86.2 kg (190 lb) (03/30/24 2018)  SpO2: 99 % (03/30/24 2200)    Constitutional:  Well developed, well nourished, no acute distress, non-toxic appearance   Eyes:  PERRL, conjunctiva normal   HENT:  Atraumatic, external ears normal, nose normal, oropharynx moist, no pharyngeal exudates. Neck- normal range of motion, no tenderness, supple   Respiratory:  No respiratory distress, normal breath sounds, no rales, no wheezing   Cardiovascular:  Normal " rate, normal rhythm, no murmurs, no gallops, no rubs   GI:  Soft, nondistended, normal bowel sounds, nontender, no organomegaly, no mass, no rebound, no guarding   :  No costovertebral angle tenderness   Musculoskeletal:  No edema, no tenderness, no deformities. Back- no tenderness, bilateral paraspinal hypertonicity left somewhat greater than right  Integument:  Well hydrated, no rash   Lymphatic:  No lymphadenopathy noted   Neurologic:  Alert &awake, communicative, CN 2-12 normal, normal motor function, normal sensory function, no focal deficits noted   Psychiatric:  Speech and behavior appropriate       Lab Results: I have personally reviewed pertinent reports.      Results from last 7 days   Lab Units 03/30/24  2317   WBC Thousand/uL 5.17   HEMOGLOBIN g/dL 14.4   HEMATOCRIT % 44.1   PLATELETS Thousands/uL 275     Results from last 7 days   Lab Units 03/30/24  2317   POTASSIUM mmol/L 3.7   CHLORIDE mmol/L 102   CO2 mmol/L 23   BUN mg/dL 14   CREATININE mg/dL 0.93   CALCIUM mg/dL 9.2           Imaging: I have personally reviewed pertinent films in PACS    X-ray lumbar spine: Personally reviewed, no acute osseous abnormality identified.  Final radiologist read is pending.      ** Please Note: Dragon 360 Dictation voice to text software was used in the creation of this document. **

## 2024-03-31 NOTE — ED NOTES
Patient unable to stand without severe lower back pain. Dr. De Souza at bedside to evaluate.      Batool Rodrigez RN  03/30/24 1243

## 2024-03-31 NOTE — ASSESSMENT & PLAN NOTE
In the setting of intractable back pain, management of this as above.  Attempt to ambulate patient in a.m.

## 2024-03-31 NOTE — ASSESSMENT & PLAN NOTE
In the setting of intractable back pain, management of this as above.    Pt able to ambulate this am

## 2024-03-31 NOTE — ASSESSMENT & PLAN NOTE
Presenting with intractable low back pain and inability to ambulate secondary to this after lifting heavy object/ luggage bag from [place]   Pain somewhat improved following Toradol, prednisone, Robaxin, and oxycodone during ED evaluation . Had ongoing pain but improved with multiple doses of medications   Obtained x-ray lumbar spine,  no acute osseous abnormality identified  Trial pain control regimen with scheduled Tylenol, Lidoderm patch, Toradol IV, Robaxin, Medrol Dosepak with Protonix for acid suppression  Pt reports he is doing better after the night of medications ,able to walk no urinary or bowel concerns.   Pt with good lower extremity strength   Instruction provided to the pt in regard to medication regimen at discharge  Instructed to call and schedule fu appointment with neurosurgery if ongoing concern and if staying in the area. Otherwise will seek fu in his home area of [place]

## 2024-03-31 NOTE — ED NOTES
Patient unable to stand up without significant severe pain. Dr. Hernandez at bedside      Batool Rodrigez RN  03/30/24 7852

## 2024-03-31 NOTE — ED NOTES
ED RN assisted pt with his belongings to accompany his family member that is in patient care.      Marci Robison RN  03/31/24 0921

## 2024-04-01 NOTE — ED ATTENDING ATTESTATION
"3/30/2024  I, Bernardo Hernandez MD, saw and evaluated the patient. I have discussed the patient with the resident/non-physician practitioner and agree with the resident's/non-physician practitioner's findings, Plan of Care, and MDM as documented in the resident's/non-physician practitioner's note, except where noted. All available labs and Radiology studies were reviewed.  I was present for key portions of any procedure(s) performed by the resident/non-physician practitioner and I was immediately available to provide assistance.       At this point I agree with the current assessment done in the Emergency Department.  I have conducted an independent evaluation of this patient a history and physical is as follows:    ED Course       Patient is a 33-year-old male with history of left lower back pain/sciatica who presents from possible floor as a \"medical emergency\".  Patient states that he recently traveled from [facility] via train to visit his mother who is currently hospitalized at [facility] for neurosurgery.  In the process, patient was carrying a large heavy duffel bag on his right side was noted to have gotten up from a seated position today and developed severe lower back pain preventing him from standing upright and walking.  He denies any bowel or bladder incontinence or retention.  Denies any focal numbness or weakness of his lower extremities.  He denies any radicular symptoms at this time.  Patient states the pain is severe pain and making it unable for him to stand and walk at this time.    Vitals reviewed.  No CT or L-spine tenderness to palpation.  Lower extremities strength is 5 out of 5 albeit restricted secondary to lower back pain.  Normal dorsiflexion plantarflexion of feet normal sensation.    Impression low back pain  Differential diagnosis: Sprain strain degenerative disc disease lumbar disc herniation doubt cauda equina doubt epidural abscess.    Discussion with patient regarding goals of " care will attempt to control patient's pain in ED as he would very much like to get back to seeing his family member who is currently inpatient in the hospital.  Reassess    Patient was given escalating doses of multimodal analgesia however due to severity of symptoms was unable to stand and walk safely in emergency department.  Upon further discussion with patient at bedside will admit patient for tractable back pain for further evaluation management.    Patient admitted to medicine service.       Critical Care Time  Procedures

## 2024-04-04 ENCOUNTER — OFFICE VISIT (OUTPATIENT)
Dept: NEUROSURGERY | Facility: CLINIC | Age: 34
End: 2024-04-04
Payer: COMMERCIAL

## 2024-04-04 VITALS
DIASTOLIC BLOOD PRESSURE: 93 MMHG | HEART RATE: 66 BPM | SYSTOLIC BLOOD PRESSURE: 136 MMHG | BODY MASS INDEX: 23.75 KG/M2 | OXYGEN SATURATION: 97 % | HEIGHT: 75 IN | TEMPERATURE: 98.3 F

## 2024-04-04 DIAGNOSIS — M54.50 ACUTE BILATERAL LOW BACK PAIN WITHOUT SCIATICA: Primary | ICD-10-CM

## 2024-04-04 DIAGNOSIS — M54.9 BACK PAIN: ICD-10-CM

## 2024-04-04 PROCEDURE — 99203 OFFICE O/P NEW LOW 30 MIN: CPT | Performed by: PHYSICIAN ASSISTANT

## 2024-04-04 NOTE — ASSESSMENT & PLAN NOTE
Patient presents as a new consult at the request of [facility] for the evaluation of back pain  Chronic back pain, exacerbated after carrying a large duffle bag 3/30/2024; admitted for pain control at that time  Lives in [place] but here to take care of his mother who just had brain surgery    Imaging:  XR lumbar spine 3/30/2024: No acute osseous abnormality.     Plan:  Exam:  grossly nonfocal  Imaging reviewed with patient.  No obvious abnormality appreciated.  Discussed that patient is likely having some nerve inflammation although without having an MRI it is difficult to say if there is any compressive etiology contributing to this.  Thankfully patient states his pain is much better, he is not having any pain in the posterior aspect of his leg, just some mild back pain.  Reasonable to continue with conservative measures at this time  Continue over-the-counter and prescribed medication for pain control, okay to take Tylenol, steroid pack and then subsequent NSAIDs when those are completed, topical agents, heat or ice, muscle relaxers, etc.  Home exercise program provided for back stretches in after visit summary; consider formal physical therapy evaluation once back in [place] for back and core strengthening and mobility  Okay to follow-up with orthopedic surgeon in [place] should pain become more persistent in which case he may require MRI to rule out compressive etiology which may require surgical intervention  Center outpatient pain management for consideration of epidural steroid injection  Patient is to return to the office on an as-needed basis or sooner should patient develop worsening symptoms or red flag signs

## 2024-04-04 NOTE — PATIENT INSTRUCTIONS
Core Strengthening Exercises   AMBULATORY CARE:   What you need to know about core strengthening exercises:  Your core includes the muscles of your lower back, hip, pelvis, and abdomen. Core strengthening exercises help heal and strengthen these muscles. This helps prevent another injury, and keeps your pelvis, spine, and hips in the correct position.  Call your doctor or physical therapist if:   You have sharp or worsening pain during exercise or at rest.    You have questions or concerns about your shoulder exercises.    Safety tips:  Talk to your healthcare provider before you start an exercise program. A physical therapist can teach you how to do core strengthening exercises safely.  Do the exercises on a mat or firm surface.  A firm surface will support your spine and prevent low back pain. Do not do these exercises on a bed.    Move slowly and smoothly.  Avoid fast or jerky motions.    Stop if you feel pain.  You may feel some discomfort at first, but you should not feel pain. Tell your provider or physical therapist if you have pain while you exercise. Regular exercise will help decrease your discomfort over time.    Breathe normally during core exercises.  Do not hold your breath. This may cause an increase in blood pressure and prevent muscle strengthening. Your healthcare provider will tell you when to inhale and exhale during the exercise.    Begin all of your exercises with abdominal bracing.  Abdominal bracing helps warm up your core muscles. You can also practice abdominal bracing throughout the day. Lie on your back with your knees bent and feet flat on the floor. Place your arms in a relaxed position beside your body. Tighten your abdominal muscles. Pull your belly button in and up toward your spine. Hold for 5 seconds. Relax your muscles. Repeat 10 times.       Core strengthening exercises:  Your healthcare provider will tell you how often to do these exercises. The provider will also tell you how  many repetitions of each exercise you should do. Hold each exercise for 5 seconds or as directed. As you get stronger, increase your hold to 10 to 15 seconds. You can do some of these exercises on a stability ball, or with a weight. Ask your healthcare provider how to use a stability ball or weight for these exercises:  Bridging:  Lie on your back with your knees bent and feet flat on the floor. Rest your arms at your side. Tighten your buttocks, and then lift your hips 1 inch off the floor. Hold for 5 seconds. When you can do this exercise without pain for 10 seconds, increase the distance you lift your hips. A good goal is to be able to lift your hips so that your shoulders, hips, and knees are in a straight line.         Dead bug:  Lie on your back with your knees bent and feet flat on the floor. Place your arms in a relaxed position beside your body. Begin with abdominal bracing. Next, raise one leg, keeping your knee bent. Hold for 5 seconds. Repeat with the other leg. When you can do this exercise without pain for 10 to 15 seconds, you may raise one straight leg and hold. Repeat with the other leg.         Quadruped:  Place your hands and knees on the floor. Keep your wrists directly below your shoulders and your knees directly below your hips. Pull your belly button in toward your spine. Do not flatten or arch your back. Tighten your abdominal muscles below your belly button. Hold for 5 seconds. When you can do this exercise without pain for 10 to 15 seconds, you may extend one arm and hold. Repeat on the other side.         Side bridge exercises:      Standing side bridge:  Stand next to a wall and extend one arm toward the wall. Place your palm flat on the wall with your fingers pointing upward. Begin with abdominal bracing. Next, without moving your feet, slowly bend your arm to 90 degrees. Hold for 5 seconds. Repeat on the other side. When you can do this exercise without pain for 10 to 15 seconds, you  may do the bent leg side bridge on the floor.         Bent leg side bridge:  Lie on one side with your legs, hips, and shoulders in a straight line. Prop yourself up onto your forearm so your elbow is directly below your shoulder. Bend your knees back to 90 degrees. Begin with abdominal bracing. Next, lift your hips and balance yourself on your forearm and knees. Hold for 5 seconds. Repeat on the other side. When you can do this exercise without pain for 10 to 15 seconds, you may do the straight leg side bridge on the floor.         Straight leg side bridge:  Lie on one side with your legs, hips, and shoulders in a straight line. Prop yourself up onto your forearm so your elbow is directly below your shoulder. Begin with abdominal bracing. Lift your hips off the floor and balance yourself on your forearm and the outside of your flexed foot. Do not let your ankle bend sideways. Hold for 5 seconds. Repeat on the other side. When you can do this exercise without pain for 10 to 15 seconds, ask your healthcare provider for more advanced exercises.       Superman:  Lie on your stomach. Extend your arms forward on the floor. Tighten your abdominal muscles and lift your right hand and left leg off the floor. Hold this position. Slowly return to the starting position. Tighten your abdominal muscles and lift your left hand and right leg off the floor. Hold this position. Slowly return to the starting position.         Clam:  Lie on your side with your knees bent. Put your bottom arm under your head to keep your neck in line. Put your top hand on your hip to keep your pelvis from moving. Put your heels together, and keep them together during this exercise. Slowly raise your top knee toward the ceiling. Then lower your leg so your knees are together. Repeat this exercise 10 times. Then switch sides and do the exercise 10 times with the other leg.         Curl up:  Lie on your back with your knees bent and feet flat on the  floor. Place your hands, palms down, underneath your lower back. Next, with your elbows on the floor, lift your shoulders and chest 2 to 3 inches off the floor. Keep your head in line with your shoulders. Hold this position. Slowly return to the starting position.         Straight leg raises:  Lie on your back with one leg straight. Bend the other knee and place your foot flat on the floor. Tighten your abdominal muscles. Keep your leg straight and slowly lift it straight up 6 to 12 inches off the floor. Hold this position. Lower your leg slowly. Do as many repetitions as directed on this side. Repeat with the other leg.         Plank:  Lie on your stomach. Bend your elbows and place your forearms flat on the floor. Lift your chest, stomach, and knees off the floor. Make sure your elbows are below your shoulders. Your body should be in a straight line. Do not let your hips or lower back sink to the ground. Squeeze your abdominal muscles together and hold for 15 seconds. To make this exercise harder, hold for 30 seconds or lift 1 leg at a time.         Bicycles:  Lie on your back. Bend both knees and bring them toward your chest. Your calves should be parallel to the floor. Place the palms of your hands on the back of your head. Straighten your right leg and keep it lifted 2 inches off the floor. Raise your head and shoulders off the floor and twist towards your left. Keep your head and shoulders lifted. Bend your right knee while you straighten your left leg. Keep your left leg 2 inches off the floor. Twist your head and chest towards the left leg. Continue to straighten 1 leg at a time and twist.       Follow up with your doctor or physical therapist as directed:  Write down your questions so you remember to ask them during your visits.  © Copyright [employer] 2023 Information is for End User's use only and may not be sold, redistributed or otherwise used for commercial purposes.  The above information is an   only. It is not intended as medical advice for individual conditions or treatments. Talk to your doctor, nurse or pharmacist before following any medical regimen to see if it is safe and effective for you.  Lower Back Exercises   AMBULATORY CARE:   Lower back exercises  help heal and strengthen your back muscles to prevent another injury. Ask your healthcare provider if you need to see a physical therapist for more advanced exercises.  Seek care immediately if:   You have severe pain that prevents you from moving.       Call your doctor if:   Your pain becomes worse.    You have new pain.    You have questions or concerns about your condition or care.    Do lower back exercises safely:   Do the exercises on a mat or firm surface (not on a bed).  A firm surface will support your spine and prevent low back pain.    Move slowly and smoothly.  Avoid fast or jerky motions.    Breathe normally.  Do not hold your breath.    Stop if you feel pain.  It is normal to feel some discomfort at first, but you should not feel pain. Regular exercise will help decrease your discomfort over time.    Lower back exercises:  Your healthcare provider may recommend that you do back exercises 10 to 30 minutes each day. He or she may also recommend that you do exercises 1 to 3 times each day. Ask your provider which exercises are best for you and how often to do them.  Ankle pumps:  Lie on your back. Move your foot up (with your toes pointing toward your head). Then, move your foot down (with your toes pointing away from you). Repeat this exercise 10 times on each side.         Heel slides:  Lie on your back. Slowly bend one leg and then straighten it. Next, bend the other leg and then straighten it. Repeat 10 times on each side.         Pelvic tilt:  Lie on your back with your knees bent and feet flat on the floor. Place your arms in a relaxed position beside your body. Tighten the muscles of your abdomen and flatten your  back against the floor. Hold for 5 seconds. Repeat 5 times.         Back stretch:  Lie on your back with your hands behind your head. Bend your knees and turn the lower half of your body to one side. Hold this position for 10 seconds. Repeat 3 times on each side.         Straight leg raises:  Lie on your back with one leg straight. Bend the other knee. Tighten your abdomen and then slowly lift the straight leg up about 6 to 12 inches off the floor. Hold for 1 to 5 seconds. Lower your leg slowly. Repeat 10 times on each leg.         Knee-to-chest:  Lie on your back with your knees bent and feet flat on the floor. Pull one of your knees toward your chest and hold it there for 5 seconds. Return your leg to the starting position. Lift the other knee toward your chest and hold for 5 seconds. Do this 5 times on each side.         Cat and camel:  Place your hands and knees on the floor. Arch your back upward toward the ceiling and lower your head. Round out your spine as much as you can. Hold for 5 seconds. Lift your head upward and push your chest downward toward the floor. Hold for 5 seconds. Do 3 sets or as directed.         Wall squats:  Stand with your back against a wall. Tighten the muscles of your abdomen. Slowly lower your body until your knees are bent at a 45 degree angle. Hold this position for 5 seconds. Slowly move back up to a standing position. Repeat 10 times.         Curl up:  Lie on your back with your knees bent and feet flat on the floor. Place your hands, palms down, underneath the curve in your lower back. Next, with your elbows on the floor, lift your shoulders and chest 2 to 3 inches. Keep your head in line with your shoulders. Hold this position for 5 seconds. When you can do this exercise without pain for 10 to 15 seconds, you may add a rotation. While your shoulders and chest are lifted off the ground, turn slightly to the left and hold. Repeat on the other side.         Bird dog:  Place your  hands and knees on the floor. Keep your wrists directly below your shoulders and your knees directly below your hips. Pull your belly button in toward your spine. Do not flatten or arch your back. Tighten your abdominal muscles. Raise one arm straight out so that it is aligned with your head. Next, raise the leg opposite your arm. Hold this position for 15 seconds. Lower your arm and leg slowly and change sides. Do 5 sets.       Follow up with your doctor as directed:  Write down your questions so you remember to ask them during your visits.  © Copyright [employer] 2023 Information is for End User's use only and may not be sold, redistributed or otherwise used for commercial purposes.  The above information is an  only. It is not intended as medical advice for individual conditions or treatments. Talk to your doctor, nurse or pharmacist before following any medical regimen to see if it is safe and effective for you.

## 2024-04-04 NOTE — PROGRESS NOTES
Neurosurgery Office Note  Bonnie Rudd 33 y.o. male MRN: [id number]      Assessment/Plan     Acute bilateral low back pain without sciatica  Patient presents as a new consult at the request of [facility] for the evaluation of back pain  Chronic back pain, exacerbated after carrying a large duffle bag 3/30/2024; admitted for pain control at that time  Lives in [place] but here to take care of his mother who just had brain surgery    Imaging:  XR lumbar spine 3/30/2024: No acute osseous abnormality.     Plan:  Exam:  grossly nonfocal  Imaging reviewed with patient.  No obvious abnormality appreciated.  Discussed that patient is likely having some nerve inflammation although without having an MRI it is difficult to say if there is any compressive etiology contributing to this.  Thankfully patient states his pain is much better, he is not having any pain in the posterior aspect of his leg, just some mild back pain.  Reasonable to continue with conservative measures at this time  Continue over-the-counter and prescribed medication for pain control, okay to take Tylenol, steroid pack and then subsequent NSAIDs when those are completed, topical agents, heat or ice, muscle relaxers, etc.  Home exercise program provided for back stretches in after visit summary; consider formal physical therapy evaluation once back in [place] for back and core strengthening and mobility  Okay to follow-up with orthopedic surgeon in [place] should pain become more persistent in which case he may require MRI to rule out compressive etiology which may require surgical intervention  Center outpatient pain management for consideration of epidural steroid injection  Patient is to return to the office on an as-needed basis or sooner should patient develop worsening symptoms or red flag signs       Diagnoses and all orders for this visit:    Acute bilateral low back pain without sciatica  -     Ambulatory Referral to Neurosurgery    Back pain  -      Ambulatory Referral to Neurosurgery          I have spent a total time of 40 minutes on 04/04/24 in caring for this patient including Diagnostic results, Instructions for management, Patient and family education, Impressions, Counseling / Coordination of care, Documenting in the medical record, Reviewing / ordering tests, medicine, procedures  , and Obtaining or reviewing history  .      CHIEF COMPLAINT    Chief Complaint   Patient presents with    Consult     Back Pain -- L/S Xray on 3/30/24         HISTORY    History of Present Illness     Patient is a 32 yo male presenting to the office for a ED FU concerning back pain. He was traveling from [facility] via train 3/26 to visit his mother. He was carrying a large heavy duffle bag on his left shoulder, when getting off the train the patient noticed low back pain sending him to the ED. While in the ED he experienced left leg weakness that he could not move from the bed to a wheel chair. He has a history of back pain x 2 years that comes and goes, pain exacerbated with heavy lifting. When the flare ups occur, he reports 'riding them out' with no treatment including heat/ice. He reports following with an orthopedic surgeon in [place], he received no images and never followed up with PT per recommendation as his pain improved on its own. Today he reports that the pain is getting better and rates the pain a 2-3/10. He describes the pain as ' gripping and tight' and has been constant. Pain is localized to lower back that radiates to his left buttock and left posterior thigh, although the leg pain is essentially resolved at this point. Denies numbness/tingling/weakness to upper and lower extremities. Denies urinary/bowel changes. The pain is exacerbated with prolonged sitting and standing. He reports the muscle relaxer's provided in ED bring him relief. Patient is unsure when he is returning to [place] as he is currently visiting is mother that had brain surgery with our service  earlier this week and he plans to stay with her when she returns home to take care of her.        See Discussion    REVIEW OF SYSTEMS    Review of Systems   Constitutional: Negative.    HENT: Negative.     Eyes: Negative.    Respiratory: Negative.     Cardiovascular: Negative.    Gastrointestinal: Negative.  Negative for constipation and diarrhea.   Endocrine: Negative.    Genitourinary: Negative.  Negative for difficulty urinating and urgency.   Musculoskeletal:  Positive for back pain. Negative for gait problem.        Back Pain -- L/S Xray on 3/30/24    Back pain radiates to left buttock and posterior leg x 2 yrs  Patient states he get these flair ups of back pain.   Last episode began on 3/26/24 due to carrying a large duffle bag   Denies N/W  or any B/B issues.   Prolonged sitting aggravates his pain  Intermittent Left sided muscle spasm.    Admits to difficulty walking.   Pain 3/10- Constant, sharp, shooting pain  Meds: Robaxin, Medrol; Dose pack provided 25% relief lasting 5 days.    No Ac/ Smokes Marijuana   No previous back sx     Skin: Negative.    Allergic/Immunologic: Negative.    Neurological: Negative.  Negative for weakness and numbness.   Hematological: Negative.    Psychiatric/Behavioral: Negative.     All other systems reviewed and are negative.      ROS obtained by MA. Reviewed. See HPI.     Meds/Allergies     Current Outpatient Medications   Medication Sig Dispense Refill    lidocaine (LIDODERM) 5 % Apply 1 patch topically over 12 hours daily for 5 days Remove & Discard patch within 12 hours or as directed by MD 5 patch 0    methocarbamol (ROBAXIN) 750 mg tablet Take 1 tablet (750 mg total) by mouth every 6 (six) hours Take every 6 hrs for next 72 hrs then go as needed based on pain 30 tablet 0    pantoprazole (PROTONIX) 40 mg tablet Take 1 tablet (40 mg total) by mouth daily in the early morning Take this medication as long as you are taking the Ibuprofen and the steroids to protect your stomach  "30 tablet 0    acetaminophen (TYLENOL) 500 mg tablet Take 2 tablets (1,000 mg total) by mouth every 8 (eight) hours For next 72 hrs then only take as needed after that (do not take more than 4000 mg of tylenol in a 24 hrs period) (Patient not taking: Reported on 4/4/2024)      methylPREDNISolone (MEDROL) 4 mg tablet Take 5 tablets (20 mg total) by mouth daily for 1 day, THEN 4 tablets (16 mg total) daily for 1 day, THEN 3 tablets (12 mg total) daily for 1 day, THEN 2 tablets (8 mg total) daily for 1 day, THEN 1 tablet (4 mg total) daily for 1 day. Do not start before April 1, 2024. (Patient not taking: Reported on 4/4/2024) 15 tablet 0     No current facility-administered medications for this visit.       No Known Allergies    PAST HISTORY    Past Medical History:   Diagnosis Date    Sciatica        History reviewed. No pertinent surgical history.    Social History     Tobacco Use    Smoking status: Never    Smokeless tobacco: Never   Substance Use Topics    Alcohol use: Yes     Alcohol/week: 2.0 standard drinks of alcohol     Types: 2 Standard drinks or equivalent per week    Drug use: Never       History reviewed. No pertinent family history.      Above history personally reviewed.       EXAM    Vitals:Blood pressure 136/93, pulse 66, temperature 98.3 °F (36.8 °C), temperature source Temporal, height 6' 3\" (1.905 m), SpO2 97%.,Body mass index is 23.75 kg/m².     Physical Exam  Constitutional:       General: He is awake. He is not in acute distress.     Appearance: Normal appearance.   HENT:      Head: Normocephalic and atraumatic.   Eyes:      Extraocular Movements: Extraocular movements intact.      Conjunctiva/sclera: Conjunctivae normal.      Pupils: Pupils are equal, round, and reactive to light.   Cardiovascular:      Rate and Rhythm: Normal rate.      Pulses: Normal pulses.   Pulmonary:      Effort: Pulmonary effort is normal. No respiratory distress.   Musculoskeletal:      Cervical back: Normal range of " motion. No tenderness or bony tenderness.      Thoracic back: No tenderness or bony tenderness.      Lumbar back: No bony tenderness.        Back:       Comments: Left lower back tenderness   Neurological:      General: No focal deficit present.      Mental Status: He is alert and oriented to person, place, and time.      Sensory: Sensation is intact.      Motor: Motor strength is normal.Motor function is intact. No weakness or pronator drift.      Coordination: Coordination is intact. Finger-Nose-Finger Test normal.      Gait: Gait is intact. Gait normal.   Psychiatric:         Attention and Perception: Attention and perception normal.         Mood and Affect: Mood and affect normal.         Speech: Speech normal.         Behavior: Behavior normal. Behavior is cooperative.         Thought Content: Thought content normal.         Cognition and Memory: Cognition and memory normal.         Judgment: Judgment normal.         Neurologic Exam     Mental Status   Oriented to person, place, and time.   Follows 1 step commands.   Attention: normal. Concentration: normal.   Speech: speech is normal   Level of consciousness: alert  Knowledge: good.   Normal comprehension.     Cranial Nerves     CN II   Visual fields full to confrontation.     CN III, IV, VI   Pupils are equal, round, and reactive to light.  CN III: no CN III palsy  CN VI: no CN VI palsy  Conjugate gaze: present    CN V   Facial sensation intact.   Right corneal reflex: normal    CN VII   Facial expression full, symmetric.     CN VIII   Hearing: intact    CN IX, X   CN IX normal.     CN XI   CN XI normal.     CN XII   CN XII normal.     Motor Exam   Muscle bulk: normal  Overall muscle tone: normal    Strength   Strength 5/5 throughout.     Sensory Exam   Light touch normal.     Gait, Coordination, and Reflexes     Gait  Gait: normal    Coordination   Finger to nose coordination: normal    Tremor   Resting tremor: absent  Intention tremor: absent  Action  tremor: absent    Reflexes   Right : 2+  Left : 2+        MEDICAL DECISION MAKING    Imaging Studies:     XR spine lumbar minimum 4 views non injury    Result Date: 3/31/2024  Narrative: XR SPINE LUMBAR MINIMUM 4 VIEWS NON INJURY INDICATION: Worsening low back pain. COMPARISON: None FINDINGS: No acute fracture. Intact pedicles. Five non-rib-bearing lumbar vertebral bodies. Normal alignment. No significant degenerative changes. Unremarkable soft tissues.     Impression: No acute osseous abnormality. Workstation performed: [id number]       I have personally reviewed pertinent reports.   and I have personally reviewed pertinent films in PACS